# Patient Record
(demographics unavailable — no encounter records)

---

## 2024-10-30 NOTE — HISTORY OF PRESENT ILLNESS
[de-identified] : The upper endoscopy performed at the [facility] GI endoscopy suite on February 15, 2024 revealed a small hiatal hernia and mild diffuse bile gastritis with scattered gastric polys in the body of the stomach. The gastric pathology performed on February 15, 2024 revealed esophageal mucosa with squamous esophageal mucosa with reflux changes with a PASF stain that is negative for fungal microorganisms (esophagus), gastric oxyntic type glandular mucosa with mild chronic inflammation that was negative for intestinal metaplasia or dysplasia (body of the stomach) gastric oxyntic mucosa with fundic gland polyps that was negative for Helicobacter pylori (antrum) and duodenal mucosa with preserved villous architecture and no significant diagnostic changes that was negative for increased intraepithelial lymphocytes (duodenum).   [FreeTextEntry1] : The colonoscopy to the cecum performed at the [facility] endoscopy suite on September 23, 2024 revealed a sigmoid colon polyp, mild left sided diverticulosis, a poor prep colonoscopy and small internal hemorrhoids. The study was limited secondary to retained liquid and solid stool scattered throughout the colon but no gross lesions were noted. Unable to comment on small colonic polyps or masses secondary to the poor prep. The colonic polyp was snared and removed with a cold snare. There was minimal bleeding post polypectomy. There were no other polyps, masses, AVMs or colitis noted.  The colonic pathology performed on September 23, 2024 revealed a tubular adenoma (sigmoid colon polyp at 30 cm).

## 2024-10-30 NOTE — ASSESSMENT
[FreeTextEntry1] : Dyspepsia: The patient complains of dyspeptic symptoms.  The patient was advised to continue to abide by an anti-gas (low FOD-MAP) diet.  The patient was previously given a pamphlet for anti-gas (low FOD-MAP).  The patient and I reviewed the anti-gas (low FOD-MAP) diet at length again. The patient is to continue on a trial of Simethicone one tablet 4 times a day p.r.n. abdominal pain and gas. Rectal Bleeding: The patient had episodes of rectal bleeding.  The etiology of the rectal bleeding is unclear.  I recommend a trial of Anusol HC suppositories one per rectum QHS and Anusol HC 2.5% cream apply to affected area twice a day PRN hemorrhoidal bleeding or pain.  I recommend a trial of Calmoseptine cream apply to affected area twice a day for rectal discomfort. I recommend Tucks pads for the hemorrhoids.  I recommend starting Sitz baths twice a day for the hemorrhoids.  I recommend avoid wearing tight underwear and use boxers. I recommend avoid touching the perineal area.  Colonic Polyp: The patient was found to have colonic polyps on prior colonoscopy.  I recommend a repeat colonoscopy in < 1 year to reassess for colonic polyps pending patient's health unless symptomatic.  The patient agreed and will follow up for the procedure.  Diverticulosis: I recommend a high-fiber diet and avoid seeds. The patient is to consider a trial of Metamucil once a day for fiber supplementation. The patient is to also consider a trial of a probiotic such as Align once a day. The patient and I discussed the potential risk of diverticulitis and diverticular bleeding secondary to diverticular disease. The patient is aware of the importance of follow-up if these complications arise. Internal Hemorrhoids: The patient is to consider starting a trial of Anusol H. C. suppositories one per rectum nightly and Anusol HC2 .5% cream apply to affected area twice a day p.r.n. hemorrhoidal bleeding or pain. I also recommend a trial of Calmoseptine cream apply to affected area twice a day for hemorrhoidal discomfort.  I recommend Tucks pads for the hemorrhoids.  I recommend Sitz baths twice a day for the hemorrhoids.  I recommend avoid wearing tight underwear and use boxers.  I recommend avoid touching the perineal area.  The patient agreed to followup.  Poor Prep Colonoscopy: The patient had a recent poor prep colonoscopy. I recommend a repeat colonoscopy in < 1 year to reassess for colonic polyps secondary to the poor prep.  The patient was told of the risks and benefits of the procedure.  The patient was told of the risks of perforation, emergency surgery, bleeding, infections and missed lesions. The patient agreed and will return for the procedure. Reflux Esophagitis: The patient had reflux esophagitis noted on prior upper endoscopy. The patient was advised to avoid late-night meals and dietary indiscretions. The patient was advised to avoid fried and fatty foods. The patient was advised to abide by an anti-GERD diet. The patient was given a pamphlet for anti-GERD. The patient and I reviewed the anti-GERD diet at length. I recommend a trial of Omeprazole 40 mg once a day x 3 months for the reflux esophagitis. Gastritis: The patient has a history of gastritis noted on prior upper endoscopy. The patient is to avoid nonsteroidal anti-inflammatory drugs and aspirin. I recommend a trial of Omeprazole 40 mg once a day for 3 months for the symptoms. Hiatal Hernia: The patient has a history of a hiatal hernia noted on prior upper endoscopy. The patient was advised to avoid late-night meals and dietary indiscretions. The patient was advised to avoid fried and fatty foods. The patient was advised to abide by an anti-GERD diet. The patient was given a pamphlet for anti-GERD. The patient and I reviewed the anti-GERD diet at length. I recommend a trial of Omeprazole 40 mg once a day for 3 months for the symptoms. Family History of GI Malignancy: The patient admits to a family history of GI problems. The patient's brother had a history of esophageal cancer. Prior Endoscopic Procedures: The patient had a prior colonoscopy performed by another gastroenterologist. I will try to obtain the prior colonoscopy reports. The patient is to sign a record release to obtain those records. Follow-up: The patient is to follow-up in the office in 4 weeks to reassess the symptoms. The patient was told to call the office if any further problems.

## 2024-11-03 NOTE — HISTORY OF PRESENT ILLNESS
[de-identified] : 62F PMH RA, Sjorgrens, osteoporosis, HTN presents for annual exam  medial leave until 9/8/22 feels very well ROM left shoulder improved with PT  minimal right rib pain at site of rib fracture  10/7: returned to work, doing well, minimal rigt sided rib pain  endorses heartburn has been on ppi for years, last egd 5 years ago denies dark stool, vomiting, weight loss  7/10: endorses recurrent GERD off PPI in april - bright red blood per rectum - has not had cscope in years brother recently diagnosed with esophageal ca in [place]  recent visit with rheum - stable on plaquenil recent ophth exam normal  reviewed DEXA - lowest Tscore -2.0 at [facility] - stable cw 2021  reviewed June labs - LDL 99, predm, ESR stable  prior 1/20/24: reviewed lab results: mild EDGARD egd next month denies rectal bleeding, black stools, vaginal bleeding  7.1.24: gained weight   manual /78 denies cp, sob, palpitations  11.2.24: doing well, adherent to medications  next ophth exam on 11/18 Dr Owusu  advised obtain shingles vaccine

## 2024-11-03 NOTE — PHYSICAL EXAM
Last night laying down and closed eyes and was NOT blackness-  It was like a weird kaleidoscopic- things moving around  Little images of mini people swirling around  Some moving toward him and some moving away  Tingling in his lips   no vertigo  No dizziness  Lasted all night long      Started valtrex 1000mg bid on 10/21  Had tolerated the valtrex 500mg every day from 9/29  What to do?    Thanks!     Jacquelin Ash RN     [No Acute Distress] : no acute distress [Well Nourished] : well nourished [Well Developed] : well developed [Well-Appearing] : well-appearing [Normal Sclera/Conjunctiva] : normal sclera/conjunctiva [PERRL] : pupils equal round and reactive to light [EOMI] : extraocular movements intact [Normal Outer Ear/Nose] : the outer ears and nose were normal in appearance [Normal Oropharynx] : the oropharynx was normal [No JVD] : no jugular venous distention [No Lymphadenopathy] : no lymphadenopathy [Supple] : supple [Thyroid Normal, No Nodules] : the thyroid was normal and there were no nodules present [No Respiratory Distress] : no respiratory distress  [No Accessory Muscle Use] : no accessory muscle use [Clear to Auscultation] : lungs were clear to auscultation bilaterally [Regular Rhythm] : with a regular rhythm [Normal Rate] : normal rate  [Normal S1, S2] : normal S1 and S2 [No Murmur] : no murmur heard [No Carotid Bruits] : no carotid bruits [No Varicosities] : no varicosities [No Abdominal Bruit] : a ~M bruit was not heard ~T in the abdomen [Pedal Pulses Present] : the pedal pulses are present [No Edema] : there was no peripheral edema [No Palpable Aorta] : no palpable aorta [No Extremity Clubbing/Cyanosis] : no extremity clubbing/cyanosis [Normal Appearance] : normal in appearance [No Nipple Discharge] : no nipple discharge [No Axillary Lymphadenopathy] : no axillary lymphadenopathy [Soft] : abdomen soft [Non Tender] : non-tender [Non-distended] : non-distended [No Masses] : no abdominal mass palpated [No HSM] : no HSM [Normal Bowel Sounds] : normal bowel sounds [Normal Posterior Cervical Nodes] : no posterior cervical lymphadenopathy [Normal Anterior Cervical Nodes] : no anterior cervical lymphadenopathy [No CVA Tenderness] : no CVA  tenderness [No Spinal Tenderness] : no spinal tenderness [No Joint Swelling] : no joint swelling [Grossly Normal Strength/Tone] : grossly normal strength/tone [No Rash] : no rash [Coordination Grossly Intact] : coordination grossly intact [No Focal Deficits] : no focal deficits [Normal Gait] : normal gait [Normal Affect] : the affect was normal [Normal Insight/Judgement] : insight and judgment were intact

## 2024-11-03 NOTE — HEALTH RISK ASSESSMENT
[No] : In the past 12 months have you used drugs other than those required for medical reasons? No [0] : 2) Feeling down, depressed, or hopeless: Not at all (0) [Never] : Never [[id number]] : 0 [Patient reported mammogram was normal] : Patient reported mammogram was normal [Patient reported PAP Smear was normal] : Patient reported PAP Smear was normal [MammogramDate] : 2023 [MammogramComments] : scheduled next week [PapSmearDate] : 2024 [ColonoscopyDate] : 2024 [ColonoscopyComments] : poor preop - repeat in 1 year

## 2024-12-27 NOTE — ASSESSMENT
[FreeTextEntry1] : 1. Sjogren;s with positive CHARLES, RO/LA, with dry eyes and dry mouth and polyarthritis - she has a mildly elevated RF. No improvement on methotrexate, although patient only complains of right 4th digit clicking and right foot pain - no worsening - no difference on rinvoq - continue with HCQ alone - continue with pilocarpine for dry mouth - on drops for dry eyes as well.  - stable disease refil every 3-4 months can take tramadol as needed for pain - no signs of abuse -  2. Right foot pain - previous MRI with signs of inflammation and possible stress fracture - x-rays were normal - mild pain at this time 3. Osteopenia - dexa done in 06/2023 stable osteopenia 5. trigger finger - resolved after the injection 6. right side sciatica -had MRI about 1 year ago - intermittent pain now - does exercise at home 7, left elbow epicondylitis better since the injection 8. RC tear - partial on US -ongoing pain despite injection -mild and intermittent after PT - no pain today 9 right knee pain -resolved since the injection - no pain today 10. rib fracture -healed 11. iron deficiency - repeat today - had normal endoscopy in 02/2024 - not current on supplementation  I reviewed previous labs results with patients. labs done with PCP - normal Diagnosis and Prognosis discussed Continue with current medications medications refilled F/u 4 months

## 2024-12-27 NOTE — PHYSICAL EXAM
[General Appearance - Alert] : alert [General Appearance - In No Acute Distress] : in no acute distress [General Appearance - Well Nourished] : well nourished [General Appearance - Well-Appearing] : healthy appearing [Sclera] : the sclera and conjunctiva were normal [PERRL With Normal Accommodation] : pupils were equal in size, round, and reactive to light [Extraocular Movements] : extraocular movements were intact [Neck Appearance] : the appearance of the neck was normal [Respiration, Rhythm And Depth] : normal respiratory rhythm and effort [Auscultation Breath Sounds / Voice Sounds] : lungs were clear to auscultation bilaterally [Apical Impulse] : the apical impulse was normal [Heart Rate And Rhythm] : heart rate was normal and rhythm regular [Heart Sounds] : normal S1 and S2 [Full Pulse] : the pedal pulses are present [Edema] : there was no peripheral edema [Bowel Sounds] : normal bowel sounds [Abdomen Soft] : soft [Abdomen Tenderness] : non-tender [Cervical Lymph Nodes Enlarged Posterior Bilaterally] : posterior cervical [Cervical Lymph Nodes Enlarged Anterior Bilaterally] : anterior cervical [Supraclavicular Lymph Nodes Enlarged Bilaterally] : supraclavicular [] : no rash [Sensation] : the sensory exam was normal to light touch and pinprick [Motor Exam] : the motor exam was normal [No Focal Deficits] : no focal deficits [Oriented To Time, Place, And Person] : oriented to person, place, and time [Abnormal Walk] : normal gait [Nail Clubbing] : no clubbing  or cyanosis of the fingernails [Musculoskeletal - Swelling] : no joint swelling seen [FreeTextEntry1] : all joints with full ROM - no synovitis

## 2024-12-27 NOTE — HISTORY OF PRESENT ILLNESS
[___ Month(s) Ago] : [unfilled] month(s) ago [Currently Experiencing] : currently [Arthralgias] : arthralgias [FreeTextEntry1] : had right 2nd digit deep cut - now with paresthesia over the tip of her digit.  last labs done 11/02 with normal CBC/CMP, Vitamin D and A1C seen by optho - ongoing dry eyes - on drops  only on tramadol and HCQ - no side effects of the medication - stable joint pain - some increase in pain over the knees denies any swelling or redness of affected joints - pain is present during movement - mild tenderness at rest denies any traumas had labs done last week with normal CBC/CMP/ lipids pending colo-endo   [Anorexia] : no anorexia [Weight Loss] : no weight loss [Malaise] : no malaise [Fever] : no fever [Chills] : no chills [Fatigue] : no fatigue [Depression] : no depression [Malar Facial Rash] : no malar facial rash [Skin Lesions] : no lesions [Skin Nodules] : no skin nodules [Oral Ulcers] : no oral ulcers [Cough] : no cough [Difficulty Standing] : no difficulty standing [Difficulty Walking] : no difficulty walking [Dyspnea] : no dyspnea [Myalgias] : no myalgias [Muscle Weakness] : no muscle weakness [Muscle Spasms] : no muscle spasms [Muscle Cramping] : no muscle cramping [Visual Changes] : no visual changes [Eye Pain] : no eye pain [Eye Redness] : no eye redness [Dry Eyes] : no dry eyes [de-identified] : right rib pain

## 2025-04-25 NOTE — PHYSICAL EXAM
[General Appearance - In No Acute Distress] : in no acute distress [General Appearance - Alert] : alert [General Appearance - Well Nourished] : well nourished [General Appearance - Well-Appearing] : healthy appearing [Sclera] : the sclera and conjunctiva were normal [PERRL With Normal Accommodation] : pupils were equal in size, round, and reactive to light [Extraocular Movements] : extraocular movements were intact [Neck Appearance] : the appearance of the neck was normal [Respiration, Rhythm And Depth] : normal respiratory rhythm and effort [Auscultation Breath Sounds / Voice Sounds] : lungs were clear to auscultation bilaterally [Apical Impulse] : the apical impulse was normal [Heart Rate And Rhythm] : heart rate was normal and rhythm regular [Heart Sounds] : normal S1 and S2 [Full Pulse] : the pedal pulses are present [Edema] : there was no peripheral edema [Bowel Sounds] : normal bowel sounds [Abdomen Soft] : soft [Abdomen Tenderness] : non-tender [Cervical Lymph Nodes Enlarged Posterior Bilaterally] : posterior cervical [Cervical Lymph Nodes Enlarged Anterior Bilaterally] : anterior cervical [Supraclavicular Lymph Nodes Enlarged Bilaterally] : supraclavicular [] : no rash [Sensation] : the sensory exam was normal to light touch and pinprick [Motor Exam] : the motor exam was normal [No Focal Deficits] : no focal deficits [Oriented To Time, Place, And Person] : oriented to person, place, and time [Abnormal Walk] : normal gait [Nail Clubbing] : no clubbing  or cyanosis of the fingernails [Musculoskeletal - Swelling] : no joint swelling seen [FreeTextEntry1] : all joints with full ROM - no synovitis

## 2025-04-25 NOTE — ASSESSMENT
[FreeTextEntry1] : 1. Sjogren;s with positive CHARLES, RO/LA, with dry eyes and dry mouth and polyarthritis - she has a mildly elevated RF. No improvement on methotrexate, although patient only complains of right 4th digit clicking and right foot pain - no worsening - no difference on rinvoq - continue with HCQ alone - continue with pilocarpine for dry mouth - on drops for dry eyes as well.  - stable disease refil every 3-4 months can take tramadol as needed for pain - no signs of abuse -  2. Right foot pain - previous MRI with signs of inflammation and possible stress fracture - x-rays were normal - mild pain at this time 3. Osteopenia - dexa done in 06/2023 stable osteopenia 5. trigger finger - resolved after the injection 6. right side sciatica -had MRI about 1 year ago - intermittent pain now - does exercise at home 7, left elbow epicondylitis better since the injection 8. RC tear - partial on US -ongoing pain despite injection -mild and intermittent after PT - no pain today 9 right knee pain -resolved since the injection - no pain today 10. rib fracture -healed 11. iron deficiency - repeat today - had normal endoscopy in 02/2024 - not current on supplementation  I reviewed previous labs results with patients. labs done today Diagnosis and Prognosis discussed Continue with current medications medications refilled F/u 4 months

## 2025-04-25 NOTE — HISTORY OF PRESENT ILLNESS
[___ Month(s) Ago] : [unfilled] month(s) ago [Currently Experiencing] : currently [Arthralgias] : arthralgias [FreeTextEntry1] : cut her healed, but has ongoing numbness last labs done 11/02 with normal CBC/CMP, Vitamin D and A1C doing well with stable symptoms - mild flares at times - resolves quickly only on tramadol and HCQ - no side effects of the medication - stable joint pain - some increase   [Anorexia] : no anorexia [Weight Loss] : no weight loss [Malaise] : no malaise [Fever] : no fever [Chills] : no chills [Fatigue] : no fatigue [Depression] : no depression [Malar Facial Rash] : no malar facial rash [Skin Lesions] : no lesions [Skin Nodules] : no skin nodules [Oral Ulcers] : no oral ulcers [Cough] : no cough [Difficulty Standing] : no difficulty standing [Difficulty Walking] : no difficulty walking [Dyspnea] : no dyspnea [Myalgias] : no myalgias [Muscle Weakness] : no muscle weakness [Muscle Spasms] : no muscle spasms [Muscle Cramping] : no muscle cramping [Visual Changes] : no visual changes [Eye Pain] : no eye pain [Eye Redness] : no eye redness [Dry Eyes] : no dry eyes [de-identified] : right rib pain

## 2025-05-09 NOTE — HISTORY OF PRESENT ILLNESS
[FreeTextEntry1] : routine follow up [de-identified] : 62F PMH RA, Sjorgrens, osteoporosis, HTN presents for routine follow up  medial leave until 9/8/22 feels very well ROM left shoulder improved with PT  minimal right rib pain at site of rib fracture  10/7: returned to work, doing well, minimal rigt sided rib pain  endorses heartburn has been on ppi for years, last egd 5 years ago denies dark stool, vomiting, weight loss  7/10: endorses recurrent GERD off PPI in april - bright red blood per rectum - has not had cscope in years brother recently diagnosed with esophageal ca in [place]  recent visit with rheum - stable on plaquenil recent ophth exam normal  reviewed DEXA - lowest Tscore -2.0 at [facility] - stable cw 2021  reviewed June labs - LDL 99, predm, ESR stable  prior 1/20/24: reviewed lab results: mild EDGARD egd next month denies rectal bleeding, black stools, vaginal bleeding  7.1.24: gained weight   manual /78 denies cp, sob, palpitations  11.2.24: doing well, adherent to medications  next ophth exam on 11/18 Dr Owusu  advised obtain shingles vaccine  5.3.25: UTD rheumatology f/u  - no change in medications endorses R finger tenderness - will dw rheumatology

## 2025-05-09 NOTE — PLAN
[FreeTextEntry1] : schedule annual exam with new PCP   During today's visit, I spent 30 minutes reviewing the patient's medical chart, addressing the patient's concerns, and discussing their treatment plan in detail. We reviewed the results of recent tests and discussed the next steps in their care, including medication adjustments and follow-up appointments. Additionally, I took the time to answer the patient's questions and provide education on managing their condition at home.  This extended consultation allowed for a comprehensive assessment and personalized approach to the patient, reflecting the complexity and time required for today's visit.